# Patient Record
Sex: MALE | Race: AMERICAN INDIAN OR ALASKA NATIVE
[De-identification: names, ages, dates, MRNs, and addresses within clinical notes are randomized per-mention and may not be internally consistent; named-entity substitution may affect disease eponyms.]

---

## 2019-03-04 ENCOUNTER — HOSPITAL ENCOUNTER (EMERGENCY)
Dept: HOSPITAL 5 - ED | Age: 29
Discharge: HOME | End: 2019-03-04
Payer: COMMERCIAL

## 2019-03-04 VITALS — SYSTOLIC BLOOD PRESSURE: 147 MMHG | DIASTOLIC BLOOD PRESSURE: 84 MMHG

## 2019-03-04 DIAGNOSIS — F17.200: ICD-10-CM

## 2019-03-04 DIAGNOSIS — R20.0: ICD-10-CM
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
## 2019-03-04 DIAGNOSIS — M54.2: ICD-10-CM
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
## 2019-03-04 DIAGNOSIS — M54.5: ICD-10-CM

## 2019-03-04 DIAGNOSIS — F12.90: ICD-10-CM

## 2019-03-04 DIAGNOSIS — M79.641: Primary | ICD-10-CM
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
## 2019-03-04 PROCEDURE — 99283 EMERGENCY DEPT VISIT LOW MDM: CPT

## 2019-03-04 PROCEDURE — 72070 X-RAY EXAM THORAC SPINE 2VWS: CPT
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
## 2019-03-04 NOTE — XRAY REPORT
PROCEDURE: RIGHT HAND, 2 VIEWS 

 

TECHNIQUE:  RIGHT hand radiographs, AP and lateral views. CPT 46577-US 

 

HISTORY:  Pain 

 

COMPARISONS:  None . 

 

FINDINGS: 

 

Fracture (s) and/or Dislocation(s):  None . 

Alignment:  Normal . 

Joint space(s):  Normal . 

Soft tissues:  Normal . 

Bone mineralization:  Normal . 

Foreign bodies:  None . 

 

IMPRESSION:  Normal Examination . 

 

This document is electronically signed by Miller Blanchard MD., March 4 2019 10:20:46 PM ET
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
